# Patient Record
Sex: FEMALE | Race: WHITE | NOT HISPANIC OR LATINO | Employment: UNEMPLOYED | URBAN - METROPOLITAN AREA
[De-identification: names, ages, dates, MRNs, and addresses within clinical notes are randomized per-mention and may not be internally consistent; named-entity substitution may affect disease eponyms.]

---

## 2024-11-20 DIAGNOSIS — R05.3 CHRONIC COUGH: Primary | ICD-10-CM

## 2025-03-21 DIAGNOSIS — S52.92XA UNSPECIFIED FRACTURE OF LEFT FOREARM, INITIAL ENCOUNTER FOR CLOSED FRACTURE: Primary | ICD-10-CM

## 2025-03-26 ENCOUNTER — OFFICE VISIT (OUTPATIENT)
Dept: ORTHOPEDICS | Facility: CLINIC | Age: 60
End: 2025-03-26
Payer: MEDICAID

## 2025-03-26 ENCOUNTER — TELEPHONE (OUTPATIENT)
Dept: ORTHOPEDICS | Facility: CLINIC | Age: 60
End: 2025-03-26

## 2025-03-26 ENCOUNTER — HOSPITAL ENCOUNTER (OUTPATIENT)
Dept: RADIOLOGY | Facility: HOSPITAL | Age: 60
Discharge: HOME | End: 2025-03-26
Payer: MEDICAID

## 2025-03-26 VITALS — DIASTOLIC BLOOD PRESSURE: 76 MMHG | HEART RATE: 90 BPM | OXYGEN SATURATION: 99 % | SYSTOLIC BLOOD PRESSURE: 119 MMHG

## 2025-03-26 DIAGNOSIS — M25.511 RIGHT SHOULDER PAIN, UNSPECIFIED CHRONICITY: ICD-10-CM

## 2025-03-26 DIAGNOSIS — S46.011A TRAUMATIC TEAR OF RIGHT ROTATOR CUFF, UNSPECIFIED TEAR EXTENT, INITIAL ENCOUNTER: Primary | ICD-10-CM

## 2025-03-26 PROCEDURE — 73030 X-RAY EXAM OF SHOULDER: CPT | Mod: TC,RT

## 2025-03-26 PROCEDURE — 99214 OFFICE O/P EST MOD 30 MIN: CPT | Mod: PBBFAC,25

## 2025-03-26 RX ORDER — MONTELUKAST SODIUM 10 MG/1
TABLET ORAL
COMMUNITY
Start: 2025-02-22

## 2025-03-26 RX ORDER — ALBUTEROL SULFATE 90 UG/1
INHALANT RESPIRATORY (INHALATION)
COMMUNITY

## 2025-03-26 RX ORDER — ROSUVASTATIN CALCIUM 10 MG/1
TABLET, COATED ORAL
COMMUNITY
Start: 2025-02-22

## 2025-03-26 RX ORDER — MELOXICAM 7.5 MG/1
7.5 TABLET ORAL 2 TIMES DAILY PRN
Qty: 60 TABLET | Refills: 0 | Status: SHIPPED | OUTPATIENT
Start: 2025-03-26 | End: 2025-04-25

## 2025-03-26 RX ORDER — DICLOFENAC SODIUM 10 MG/G
2 GEL TOPICAL 4 TIMES DAILY
Qty: 100 G | Refills: 3 | Status: SHIPPED | OUTPATIENT
Start: 2025-03-26

## 2025-03-26 RX ORDER — OMEPRAZOLE 40 MG/1
40 CAPSULE, DELAYED RELEASE ORAL
COMMUNITY

## 2025-03-26 RX ORDER — ALBUTEROL SULFATE 0.83 MG/ML
SOLUTION RESPIRATORY (INHALATION)
COMMUNITY

## 2025-03-26 RX ORDER — HYDROCODONE BITARTRATE AND ACETAMINOPHEN 10; 325 MG/1; MG/1
TABLET ORAL
COMMUNITY
Start: 2025-02-22

## 2025-03-26 NOTE — PROGRESS NOTES
Subjective:   Patient ID: Deanne Bermudez is a right handed 59 y.o. female  who presented to [facility] for new visit.    Chief Complaint: Pain of the Right Shoulder    History of Present Illness:  Deanne Bermudez with a PMHx right rotator cuff repair (in 2012 with Dr. Rohan Mace) presents to the clinic today for worsening right shoulder pain after a mechanical fall, DOI: 2/21/25. She explains that she had a mechanical fall while throwing something away in a dumpster, when she was falling she grabbed onto something causing her to injure the right shoulder. Since then she has had 10/10 pain globally to the shoulder. She has decreased ROM and is unable to actively lift her arm above shoulder height. Quality of pain is described as aching and sharp.  Radiates to arm. Inciting event:  Injured during mechanical fall . Pain is aggravated by all activities, work at or above shoulder height, difficulty sleeping on affected side. Patient has had prior shoulder problems. Evaluation to date: plain films and ER evaluation. Treatment to date: avoidance of activity and oral analgesics. Expectations for today's visit:  Further evaluation. PCP: JOSE Barron.    Shoulder Review of Systems:  Swelling?  no  Instability?  no  Clicking?  no  Limited ROM? yes  Fever/Chills? no  Subluxation? no  Dislocation? no      Objective:     Physical Exam:  /76 (BP Location: Left arm, Patient Position: Sitting)   Pulse 90   SpO2 99%     Appearance:  Soft tissue swelling: Left: no Right: no  Effusion: Left:  Negative Right: Negative  Erythema: Left no Right: no  Ecchymosis: Left: no Right: no  Atrophy: Left: no Right: no  Scapular winging: Left: no Right: no    Palpation:  Shoulder Tenderness: Left: none  Right: biceps tendon, globally    Range of motion:  Flexion (0-90): Left:  90 Right: 90  Abduction (0-180): Left:  180 Right: 90  External rotation (0-55): Left: 55 Right:  55  Internal rotation (0-45): Left: 45 Right: 10    Strength:  Abduction: Left: 5/5 Pain: no Right: 4/5 Pain: yes  External rotation: Left: 5/5 Pain: no Right: 4/5 Pain: yes  Internal rotation: Left: 5/5 Pain: no Right: 4/5 Pain: yes  Elbow flexion: Left: 5/5 Pain: no Right: 5/5 Pain: no  Elbow extension: Left: 5/5 Pain: no Right: 5/5 Pain: no    Special Tests:  Subacromial Impingement  Neer: Left: Negative Right: Positive  Jones: Left: Negative Right: Positive    AC Joint Arthritis:  Cross-body abduction: Left: Negative Right: Positive    Rotator Cuff Tear   Drop arm test: Left: Negative Right: Positive  Hornblower: Left: Left: Not performed Right: Not performed   Belly press test: Left: Negative Right: Negative  Diana test (Empty can): Left: Negative Right: Positive    Biceps tendon/Labral tear  Speed's: Left: Negative Right: Negative  Yergason's: Left: Negative Right: Negative  O'Linus's: Left: Negative Right: Negative  Cranck test: Left: Negative Right: Negative    Stability   Sulcus sign: Left: Not performed Right: Not performed   Apprehension test: Left: Not performed Right: Not performed   Relocation test: Left: Not performed Right: Not performed     Cervical   Spurling: Left: Negative Right: Negative    AIN/PIN/Ulnar nerve: Intact and symmetric    General appearance: NAD  Peripheral pulses: normal bilaterally   Reflexes: Left: Not performed Right: Not performed   Sensation: normal    Labs:  Last A1c: The patient doesn't have any registry metric data available     Imaging:   Previous images reviewed.  X-rays ordered and performed today: yes  # of views: 4 Laterality: right  My Interpretation:  Elevation of the humeral head, prior surgical changes with noted screw, AC joint narrowing, no fractures or dislocations noted    Assessment:     Encounter Diagnoses   Code Name Primary?    S46.011A Traumatic tear of right rotator cuff, unspecified tear extent, initial encounter Yes       Plan:      MDM: Prior external  referring provider notes reviewed. Prior external referring provider studies reviewed.   Dx:  Right rotator cuff tear- Acute new injury  Treatment Plan: Discussed with patient diagnosis and treatment recommendations. Natural history and expected course discussed. Questions answered. Reduction in offending activity. Gentle ROM exercises. Rest, ice, compression, and elevation (RICE) therapy. Home physical therapy exercise handouts provided to patient.  Discussed with patient and unfortunately Dr. Mace no longer takes her insurance and she would like to proceed with further evaluation and treatment at our clinic.  Discussed with the patient obtaining further imaging with an MRI for surgical planning.  Patient agrees with the above plan.  Will also have the patient start formal PT, PT script was given an office today.  Follow up after imaging is completed.  Imaging: radiological studies ordered and independently reviewed; discussed with patient; pending radiologist interpretation.   Procedure: Discussed injection as a treatment option if appropriate, we will obtain further imaging for further evaluation.  Therapy: Physical Therapy  Medication: START over-the-counter acetaminophen (Tylenol 1000 mg three times per day as needed)  START Voltaren Gel 1% as prescribed to affected area  START meloxicam (Mobic 15 mg daily). Please see your primary care physician for further refills.  RTC: after imaging test complete.       This note is dictated using the M*Modal Fluency Direct word recognition program. There are word recognition mistakes that are occasionally missed on review.     Birgit Soto MD  Sports Medicine Fellow

## 2025-03-26 NOTE — TELEPHONE ENCOUNTER
Patient called stating that her rx was not at the pharmacy.  I did call her and tell her that it would be sent after the note is complete     Please send anti inflammatory to her pharmacy on file.

## 2025-03-27 NOTE — TELEPHONE ENCOUNTER
Patient called asking for the MRI to be done at [facility] instead of [facility]. Please send order with Auth to [facility].

## 2025-03-27 NOTE — PROGRESS NOTES
Faculty Attestation: Deanne Bermudez  was seen in Sports Medicine Clinic. Services were furnished in a primary care sports medicine center in the outpatient department at a [facility]. Discussed with Dr. Soto at the time of the visit. History of Present Illness, Physical Exam, and Assessment and Plan reviewed.  I participated in the management of the patient and was immediately available throughout the encounter. Treatment plan is reasonable and appropriate. Compliance with treatment recommendations is important.    Radiology images independently reviewed and agree with radiologist interpretation. No procedure was performed.     Paola Fernandez MD  Sports Medicine      Insurance: Medicaid  Patient Type: New patient to [facility]  Problem Type: New condition to [facility]  Condition: Acute Condition - new injury/illness that requires addition work-up or treatment

## 2025-07-02 ENCOUNTER — TELEPHONE (OUTPATIENT)
Dept: ORTHOPEDICS | Facility: CLINIC | Age: 60
End: 2025-07-02
Payer: MEDICAID

## 2025-07-02 NOTE — TELEPHONE ENCOUNTER
----- Message from Alice sent at 7/2/2025 11:04 AM CDT -----  Regarding: Charles Pt- MRI  Contact: [phone]  Pt called stating she dropped off her MRI disk back in March for the physician to review and she's never heard anything back. Pt states she's in severe pain and she can't sleep at night. Pt needs to know what to do. Please advise- GA

## 2025-07-03 DIAGNOSIS — S46.011A TRAUMATIC TEAR OF RIGHT ROTATOR CUFF, UNSPECIFIED TEAR EXTENT, INITIAL ENCOUNTER: Primary | ICD-10-CM

## 2025-07-03 RX ORDER — MELOXICAM 15 MG/1
15 TABLET ORAL DAILY
Qty: 30 TABLET | Refills: 1 | Status: SHIPPED | OUTPATIENT
Start: 2025-07-03 | End: 2025-08-02

## 2025-07-03 NOTE — TELEPHONE ENCOUNTER
Please schedule Pt next available with Dr. Soto for MRI review Traumatic tear of right rotator cuff. Please include this in the in the note- (MRI radiologist read is in her chart on 04/04/2025 under office visit with unavailable provider listed.)

## 2025-07-07 ENCOUNTER — PATIENT MESSAGE (OUTPATIENT)
Dept: ORTHOPEDICS | Facility: CLINIC | Age: 60
End: 2025-07-07
Payer: MEDICAID